# Patient Record
Sex: MALE | URBAN - METROPOLITAN AREA
[De-identification: names, ages, dates, MRNs, and addresses within clinical notes are randomized per-mention and may not be internally consistent; named-entity substitution may affect disease eponyms.]

---

## 2019-06-24 ENCOUNTER — APPOINTMENT (RX ONLY)
Dept: URBAN - METROPOLITAN AREA CLINIC 117 | Facility: CLINIC | Age: 44
Setting detail: DERMATOLOGY
End: 2019-06-24

## 2019-06-24 DIAGNOSIS — L64.8 OTHER ANDROGENIC ALOPECIA: ICD-10-CM

## 2019-06-24 PROCEDURE — ? COUNSELING

## 2019-06-24 PROCEDURE — 99242 OFF/OP CONSLTJ NEW/EST SF 20: CPT

## 2019-06-24 PROCEDURE — ? DIAGNOSIS COMMENT

## 2019-06-24 ASSESSMENT — LOCATION DETAILED DESCRIPTION DERM: LOCATION DETAILED: LEFT SUPERIOR PARIETAL SCALP

## 2019-06-24 ASSESSMENT — LOCATION SIMPLE DESCRIPTION DERM: LOCATION SIMPLE: SCALP

## 2019-06-24 ASSESSMENT — LOCATION ZONE DERM: LOCATION ZONE: SCALP

## 2019-06-24 NOTE — PROCEDURE: COUNSELING
Patient Specific Counseling (Will Not Stick From Patient To Patient): Patient has had hair transplants x 3.  Recommend continue with OTC Rogaine and increase compliance (use daily for best results).
Detail Level: Zone

## 2019-06-24 NOTE — HPI: DISCOLORATION (HYPERPIGMENTATION)
Is This A New Presentation, Or A Follow-Up?: Discoloration
How Severe Is It?: mild
Additional History: Pt has headaches every day for 6 months. Pt does not take regular medications such as Tylenol. But he if does it helps. PA from Dr. Haley recommended see dermatologist due to pigmentation located on scalp, headaches and some sensitivity. Pt reports he gets dizzy in the afternoon and sleepy but he sleeps well each night. Pt has history of hair removal 2 years ago. Pt mentioned that when applying Rogaine was helpful decreasing headache pain.

## 2019-06-24 NOTE — PROCEDURE: DIAGNOSIS COMMENT
Detail Level: Simple
Comment: The patient's exam is otherwise normal.  I discussed that I do not believe any pathology primary to the skin (scalp) would explain his complaint of headaches.  He notes that he is seeing a neurologist who also specializes in sleep and sleep apnea because of possible sleep apnea.  He notes his sleep averages 4-5 hrs per night and that his wife has noticed him stop breathing for brief periods.  I encouraged him to keep this appt and evaluation as it is more likely to give him explanation and relief of his symptoms.